# Patient Record
Sex: FEMALE | Race: WHITE | NOT HISPANIC OR LATINO
[De-identification: names, ages, dates, MRNs, and addresses within clinical notes are randomized per-mention and may not be internally consistent; named-entity substitution may affect disease eponyms.]

---

## 2022-03-25 PROBLEM — Z00.00 ENCOUNTER FOR PREVENTIVE HEALTH EXAMINATION: Status: ACTIVE | Noted: 2022-03-25

## 2022-03-28 ENCOUNTER — ASOB RESULT (OUTPATIENT)
Age: 37
End: 2022-03-28

## 2022-03-28 ENCOUNTER — APPOINTMENT (OUTPATIENT)
Dept: ANTEPARTUM | Facility: CLINIC | Age: 37
End: 2022-03-28
Payer: COMMERCIAL

## 2022-03-28 PROCEDURE — 76811 OB US DETAILED SNGL FETUS: CPT

## 2022-04-01 ENCOUNTER — TRANSCRIPTION ENCOUNTER (OUTPATIENT)
Age: 37
End: 2022-04-01

## 2022-07-22 ENCOUNTER — TRANSCRIPTION ENCOUNTER (OUTPATIENT)
Age: 37
End: 2022-07-22

## 2022-07-22 ENCOUNTER — INPATIENT (INPATIENT)
Facility: HOSPITAL | Age: 37
LOS: 0 days | Discharge: HOME | End: 2022-07-23
Attending: OBSTETRICS & GYNECOLOGY | Admitting: OBSTETRICS & GYNECOLOGY

## 2022-07-22 VITALS
DIASTOLIC BLOOD PRESSURE: 67 MMHG | HEART RATE: 75 BPM | SYSTOLIC BLOOD PRESSURE: 125 MMHG | TEMPERATURE: 99 F | RESPIRATION RATE: 14 BRPM

## 2022-07-22 DIAGNOSIS — Z3A.00 WEEKS OF GESTATION OF PREGNANCY NOT SPECIFIED: ICD-10-CM

## 2022-07-22 DIAGNOSIS — O26.899 OTHER SPECIFIED PREGNANCY RELATED CONDITIONS, UNSPECIFIED TRIMESTER: ICD-10-CM

## 2022-07-22 LAB
APTT BLD: 27 SEC — SIGNIFICANT CHANGE UP (ref 27–36.3)
BLD GP AB SCN SERPL QL: NEGATIVE — SIGNIFICANT CHANGE UP
FIBRINOGEN PPP-MCNC: 612 MG/DL — HIGH (ref 330–520)
HCT VFR BLD CALC: 38.2 % — SIGNIFICANT CHANGE UP (ref 34.5–45)
HGB BLD-MCNC: 12.8 G/DL — SIGNIFICANT CHANGE UP (ref 11.5–15.5)
INR BLD: 0.93 RATIO — SIGNIFICANT CHANGE UP (ref 0.88–1.16)
MCHC RBC-ENTMCNC: 32.2 PG — SIGNIFICANT CHANGE UP (ref 27–34)
MCHC RBC-ENTMCNC: 33.5 GM/DL — SIGNIFICANT CHANGE UP (ref 32–36)
MCV RBC AUTO: 96 FL — SIGNIFICANT CHANGE UP (ref 80–100)
NRBC # BLD: 0 /100 WBCS — SIGNIFICANT CHANGE UP
NRBC # FLD: 0 K/UL — SIGNIFICANT CHANGE UP
PLATELET # BLD AUTO: 252 K/UL — SIGNIFICANT CHANGE UP (ref 150–400)
PROTHROM AB SERPL-ACNC: 10.8 SEC — SIGNIFICANT CHANGE UP (ref 10.5–13.4)
RBC # BLD: 3.98 M/UL — SIGNIFICANT CHANGE UP (ref 3.8–5.2)
RBC # FLD: 12.3 % — SIGNIFICANT CHANGE UP (ref 10.3–14.5)
RH IG SCN BLD-IMP: POSITIVE — SIGNIFICANT CHANGE UP
RH IG SCN BLD-IMP: POSITIVE — SIGNIFICANT CHANGE UP
SARS-COV-2 RNA SPEC QL NAA+PROBE: SIGNIFICANT CHANGE UP
WBC # BLD: 9.59 K/UL — SIGNIFICANT CHANGE UP (ref 3.8–10.5)
WBC # FLD AUTO: 9.59 K/UL — SIGNIFICANT CHANGE UP (ref 3.8–10.5)

## 2022-07-22 RX ORDER — OXYTOCIN 10 UNIT/ML
333.33 VIAL (ML) INJECTION
Qty: 20 | Refills: 0 | Status: DISCONTINUED | OUTPATIENT
Start: 2022-07-22 | End: 2022-07-23

## 2022-07-22 RX ORDER — SODIUM CHLORIDE 9 MG/ML
1000 INJECTION, SOLUTION INTRAVENOUS
Refills: 0 | Status: DISCONTINUED | OUTPATIENT
Start: 2022-07-22 | End: 2022-07-23

## 2022-07-22 RX ORDER — CITRIC ACID/SODIUM CITRATE 300-500 MG
15 SOLUTION, ORAL ORAL EVERY 6 HOURS
Refills: 0 | Status: DISCONTINUED | OUTPATIENT
Start: 2022-07-22 | End: 2022-07-23

## 2022-07-22 RX ORDER — CHLORHEXIDINE GLUCONATE 213 G/1000ML
1 SOLUTION TOPICAL ONCE
Refills: 0 | Status: DISCONTINUED | OUTPATIENT
Start: 2022-07-22 | End: 2022-07-23

## 2022-07-22 RX ADMIN — SODIUM CHLORIDE 125 MILLILITER(S): 9 INJECTION, SOLUTION INTRAVENOUS at 19:45

## 2022-07-22 NOTE — DISCHARGE NOTE ANTEPARTUM - ADDITIONAL INSTRUCTIONS
patient had a fall with abdominal trauma in the setting of pregnancy at 2am. Patient was monitored for 24 hours. No signs of labor at this time. Patient cleared for discharge home at this time

## 2022-07-22 NOTE — DISCHARGE NOTE ANTEPARTUM - NS MD DC FALL RISK RISK
For information on Fall & Injury Prevention, visit: [email or web address] OR  [email or web address] OR  https://www.cdc.gov/steadi/patient.html

## 2022-07-22 NOTE — CHART NOTE - NSCHARTNOTEFT_GEN_A_CORE
PA Note    patient marycarmen  not feeling them    VS  T(C): 36.7 (07-22-22 @ 15:28)  HR: 77 (07-22-22 @ 15:28)  BP: 99/53 (07-22-22 @ 15:28)  RR: 17 (07-22-22 @ 15:28)  SpO2: 97% (07-22-22 @ 15:28)    /mod juan josé/+Accels/no decels  [place] irreg q 6-7min  1/50/-3    cont efm/toco  cont monitoring  hui sales

## 2022-07-22 NOTE — DISCHARGE NOTE ANTEPARTUM - HOSPITAL COURSE
patient had a fall with abdominal trauma in the setting of pregnancy at 2am. Patient was monitored for 24 hours. No signs of labor at this time. Patient cleared for discharge home at this time . Patient to follow up with OBGYN as scheduled outpatient, labor precautions given

## 2022-07-22 NOTE — OB RN TRIAGE NOTE - CHIEF COMPLAINT QUOTE
"fell on belly and hit head on night stand, feeling baby move normally. Cramping that started before the fall"

## 2022-07-22 NOTE — DISCHARGE NOTE ANTEPARTUM - CARE PLAN
Principal Discharge DX:	Fall  Assessment and plan of treatment:	patient had a fall with abdominal trauma in the setting of pregnancy at 2am. Patient was monitored for 24 hours. No signs of labor at this time. Patient cleared for discharge home at this time   1

## 2022-07-22 NOTE — OB RN PATIENT PROFILE - FALL HARM RISK - UNIVERSAL INTERVENTIONS
Bed in lowest position, wheels locked, appropriate side rails in place/Call bell, personal items and telephone in reach/Instruct patient to call for assistance before getting out of bed or chair/[place] to call system/Physically safe environment - no spills, clutter or unnecessary equipment/Purposeful Proactive Rounding/Room/bathroom lighting operational, light cord in reach

## 2022-07-22 NOTE — OB PROVIDER H&P - ASSESSMENT
P0 @ 38+4 s/p fall onto abdomen at 2am  admit to L&D  routine labs - cbc, T&S, PT/PTT/INR/Fibrinogen  NPO  IV insert    24hour of monitoring  dw Dr Sonya sales

## 2022-07-22 NOTE — OB PROVIDER H&P - HISTORY OF PRESENT ILLNESS
Patient is a  @ 38+4 gbs + EFW 7.5lbs who presented to triage s/p fall at 2am  patient directly admitted to Antepartum unit    patient states she tripped over her cat at 2am & directly hit her belly onto the floor. States she also hit the R side of her head on her dresser. Denied loss of consciousness. States her current headache is a 2/10 and denies taking any tylenol for the headache.     Patient states she has not felt increased cramping or contractions since the fall. Denies lof/vb & endorses normal fetal movement since the fall    PNC uncomplicated  O+ blood type per patient   accepts blood transfusion    POBHx: P0  GynHx: denies  MedHx: denies  SHx: denies  NKDA  Meds: PNV, Colace  Social: denies     VS  T(C): 37 (22 @ 14:48)  HR: 77 (22 @ 15:07)  BP: 99/53 (22 @ 15:07)  RR: 14 (22 @ 14:48)  SpO2: --    comfortable, alert & oriented   abd soft gravid  /mod juan josé/+accels/no decels   [place] occ ctx   Sono Vertex, anterior placenta, SHAYNA 8.4, BPP 8/8  SVE deferred

## 2022-07-22 NOTE — OB RN TRIAGE NOTE - FALL HARM RISK - UNIVERSAL INTERVENTIONS
Bed in lowest position, wheels locked, appropriate side rails in place/Call bell, personal items and telephone in reach/Instruct patient to call for assistance before getting out of bed or chair/[place] to call system/Physically safe environment - no spills, clutter or unnecessary equipment/Purposeful Proactive Rounding/Room/bathroom lighting operational, light cord in reach

## 2022-07-22 NOTE — DISCHARGE NOTE ANTEPARTUM - PATIENT PORTAL LINK FT
You can access the FollowMyHealth Patient Portal offered by [facility] by registering at the following website: [email or web address]. By joining [facility]’s FollowMyHealth portal, you will also be able to view your health information using other applications (apps) compatible with our system.

## 2022-07-23 VITALS
HEART RATE: 78 BPM | RESPIRATION RATE: 17 BRPM | TEMPERATURE: 99 F | SYSTOLIC BLOOD PRESSURE: 102 MMHG | OXYGEN SATURATION: 97 % | DIASTOLIC BLOOD PRESSURE: 60 MMHG

## 2022-07-23 LAB
COVID-19 SPIKE DOMAIN AB INTERP: POSITIVE
COVID-19 SPIKE DOMAIN ANTIBODY RESULT: >250 U/ML — HIGH
SARS-COV-2 IGG+IGM SERPL QL IA: >250 U/ML — HIGH
SARS-COV-2 IGG+IGM SERPL QL IA: POSITIVE
T PALLIDUM AB TITR SER: NEGATIVE — SIGNIFICANT CHANGE UP

## 2022-07-23 NOTE — PROGRESS NOTE ADULT - ASSESSMENT
38yo  @38w5d s/p fall with direct abdominal trauma at 2am a/f prolonged monitoring. Overnight, patient with regular, painless contractions. No cervical change or VB on SVE.  #Prolonged monitoring  - No evidence of abruption at this time    - Reactive FHT  #Maternal wellbeing  - Regular diet  - SCDs for DVT ppx    Doris Keene, PGY-3

## 2022-07-23 NOTE — PROGRESS NOTE ADULT - SUBJECTIVE AND OBJECTIVE BOX
R3 Antepartum Note,       Patient seen and examined at bedside, no acute overnight events. No acute complaints. Pt reports +FM, denies LOF, VB, ctx.    Vital Signs Last 24 Hours  T(C): 37.5 (07-23-22 @ 00:59), Max: 37.5 (07-23-22 @ 00:59)  HR: 63 (07-23-22 @ 00:59) (63 - 77)  BP: 95/54 (07-23-22 @ 00:59) (95/54 - 125/67)  RR: 18 (07-23-22 @ 00:59) (14 - 18)  SpO2: 96% (07-23-22 @ 00:59) (96% - 98%)    CAPILLARY BLOOD GLUCOSE          Physical Exam:  General: NAD  Abdomen: Soft, non-tender, gravid  Ext: No pain or swelling    SVE: 1/50/-3    Labs:             12.8   9.59  )-----------( 252      ( 07-22 @ 15:54 )             38.2           PT/INR - ( 07-22 @ 15:54 )   PT: 10.8 sec;   INR: 0.93 ratio    PTT - ( 07-22 @ 15:54 )  PTT:27.0 sec    Uric Acid: (07-22 @ 15:54)  --       Fibrinogen: (07-22 @ 15:54)  612      LDH: (07-22 @ 15:54)  --         MEDICATIONS  (STANDING):  chlorhexidine 2% Cloths 1 Application(s) Topical once  citric acid/sodium citrate Solution 15 milliLiter(s) Oral every 6 hours  lactated ringers. 1000 milliLiter(s) (125 mL/Hr) IV Continuous <Continuous>  oxytocin Infusion 333.333 milliUNIT(s)/Min (1000 mL/Hr) IV Continuous <Continuous>    MEDICATIONS  (PRN):

## 2022-07-29 ENCOUNTER — TRANSCRIPTION ENCOUNTER (OUTPATIENT)
Age: 37
End: 2022-07-29

## 2022-07-29 ENCOUNTER — INPATIENT (INPATIENT)
Facility: HOSPITAL | Age: 37
LOS: 1 days | Discharge: HOME | End: 2022-07-31
Attending: OBSTETRICS & GYNECOLOGY | Admitting: OBSTETRICS & GYNECOLOGY

## 2022-07-29 VITALS
TEMPERATURE: 98 F | RESPIRATION RATE: 15 BRPM | DIASTOLIC BLOOD PRESSURE: 61 MMHG | SYSTOLIC BLOOD PRESSURE: 110 MMHG | HEART RATE: 82 BPM

## 2022-07-29 DIAGNOSIS — O26.899 OTHER SPECIFIED PREGNANCY RELATED CONDITIONS, UNSPECIFIED TRIMESTER: ICD-10-CM

## 2022-07-29 DIAGNOSIS — O42.90 PREMATURE RUPTURE OF MEMBRANES, UNSPECIFIED AS TO LENGTH OF TIME BETWEEN RUPTURE AND ONSET OF LABOR, UNSPECIFIED WEEKS OF GESTATION: ICD-10-CM

## 2022-07-29 DIAGNOSIS — O42.10 PREMATURE RUPTURE OF MEMBRANES, ONSET OF LABOR MORE THAN 24 HOURS FOLLOWING RUPTURE, UNSPECIFIED WEEKS OF GESTATION: ICD-10-CM

## 2022-07-29 DIAGNOSIS — Z3A.00 WEEKS OF GESTATION OF PREGNANCY NOT SPECIFIED: ICD-10-CM

## 2022-07-29 LAB
BASOPHILS # BLD AUTO: 0.02 K/UL — SIGNIFICANT CHANGE UP (ref 0–0.2)
BASOPHILS NFR BLD AUTO: 0.1 % — SIGNIFICANT CHANGE UP (ref 0–2)
COVID-19 SPIKE DOMAIN AB INTERP: POSITIVE
COVID-19 SPIKE DOMAIN ANTIBODY RESULT: >250 U/ML — HIGH
EOSINOPHIL # BLD AUTO: 0.01 K/UL — SIGNIFICANT CHANGE UP (ref 0–0.5)
EOSINOPHIL NFR BLD AUTO: 0.1 % — SIGNIFICANT CHANGE UP (ref 0–6)
HCT VFR BLD CALC: 38.9 % — SIGNIFICANT CHANGE UP (ref 34.5–45)
HGB BLD-MCNC: 13.2 G/DL — SIGNIFICANT CHANGE UP (ref 11.5–15.5)
IANC: 14.39 K/UL — HIGH (ref 1.8–7.4)
IMM GRANULOCYTES NFR BLD AUTO: 0.8 % — SIGNIFICANT CHANGE UP (ref 0–1.5)
LYMPHOCYTES # BLD AUTO: 0.88 K/UL — LOW (ref 1–3.3)
LYMPHOCYTES # BLD AUTO: 5.3 % — LOW (ref 13–44)
MCHC RBC-ENTMCNC: 32.4 PG — SIGNIFICANT CHANGE UP (ref 27–34)
MCHC RBC-ENTMCNC: 33.9 GM/DL — SIGNIFICANT CHANGE UP (ref 32–36)
MCV RBC AUTO: 95.6 FL — SIGNIFICANT CHANGE UP (ref 80–100)
MONOCYTES # BLD AUTO: 1.07 K/UL — HIGH (ref 0–0.9)
MONOCYTES NFR BLD AUTO: 6.5 % — SIGNIFICANT CHANGE UP (ref 2–14)
NEUTROPHILS # BLD AUTO: 14.39 K/UL — HIGH (ref 1.8–7.4)
NEUTROPHILS NFR BLD AUTO: 87.2 % — HIGH (ref 43–77)
NRBC # BLD: 0 /100 WBCS — SIGNIFICANT CHANGE UP
NRBC # FLD: 0 K/UL — SIGNIFICANT CHANGE UP
PLATELET # BLD AUTO: 238 K/UL — SIGNIFICANT CHANGE UP (ref 150–400)
RBC # BLD: 4.07 M/UL — SIGNIFICANT CHANGE UP (ref 3.8–5.2)
RBC # FLD: 12.1 % — SIGNIFICANT CHANGE UP (ref 10.3–14.5)
SARS-COV-2 IGG+IGM SERPL QL IA: >250 U/ML — HIGH
SARS-COV-2 IGG+IGM SERPL QL IA: POSITIVE
SARS-COV-2 RNA SPEC QL NAA+PROBE: SIGNIFICANT CHANGE UP
WBC # BLD: 16.51 K/UL — HIGH (ref 3.8–10.5)
WBC # FLD AUTO: 16.51 K/UL — HIGH (ref 3.8–10.5)

## 2022-07-29 DEVICE — SURGICEL FIBRILLAR 1 X 2": Type: IMPLANTABLE DEVICE | Status: FUNCTIONAL

## 2022-07-29 RX ORDER — TETANUS TOXOID, REDUCED DIPHTHERIA TOXOID AND ACELLULAR PERTUSSIS VACCINE, ADSORBED 5; 2.5; 8; 8; 2.5 [IU]/.5ML; [IU]/.5ML; UG/.5ML; UG/.5ML; UG/.5ML
0.5 SUSPENSION INTRAMUSCULAR ONCE
Refills: 0 | Status: DISCONTINUED | OUTPATIENT
Start: 2022-07-29 | End: 2022-07-31

## 2022-07-29 RX ORDER — SODIUM CHLORIDE 9 MG/ML
500 INJECTION, SOLUTION INTRAVENOUS ONCE
Refills: 0 | Status: DISCONTINUED | OUTPATIENT
Start: 2022-07-29 | End: 2022-07-29

## 2022-07-29 RX ORDER — DIPHENHYDRAMINE HCL 50 MG
25 CAPSULE ORAL EVERY 6 HOURS
Refills: 0 | Status: DISCONTINUED | OUTPATIENT
Start: 2022-07-29 | End: 2022-07-31

## 2022-07-29 RX ORDER — OXYCODONE HYDROCHLORIDE 5 MG/1
5 TABLET ORAL ONCE
Refills: 0 | Status: DISCONTINUED | OUTPATIENT
Start: 2022-07-29 | End: 2022-07-31

## 2022-07-29 RX ORDER — AMPICILLIN TRIHYDRATE 250 MG
1 CAPSULE ORAL EVERY 4 HOURS
Refills: 0 | Status: DISCONTINUED | OUTPATIENT
Start: 2022-07-29 | End: 2022-07-29

## 2022-07-29 RX ORDER — SIMETHICONE 80 MG/1
80 TABLET, CHEWABLE ORAL EVERY 4 HOURS
Refills: 0 | Status: DISCONTINUED | OUTPATIENT
Start: 2022-07-29 | End: 2022-07-31

## 2022-07-29 RX ORDER — IBUPROFEN 200 MG
600 TABLET ORAL EVERY 6 HOURS
Refills: 0 | Status: COMPLETED | OUTPATIENT
Start: 2022-07-29 | End: 2023-06-27

## 2022-07-29 RX ORDER — HEPARIN SODIUM 5000 [USP'U]/ML
5000 INJECTION INTRAVENOUS; SUBCUTANEOUS EVERY 12 HOURS
Refills: 0 | Status: DISCONTINUED | OUTPATIENT
Start: 2022-07-29 | End: 2022-07-31

## 2022-07-29 RX ORDER — AMPICILLIN TRIHYDRATE 250 MG
2 CAPSULE ORAL ONCE
Refills: 0 | Status: COMPLETED | OUTPATIENT
Start: 2022-07-29 | End: 2022-07-29

## 2022-07-29 RX ORDER — KETOROLAC TROMETHAMINE 30 MG/ML
30 SYRINGE (ML) INJECTION EVERY 6 HOURS
Refills: 0 | Status: DISCONTINUED | OUTPATIENT
Start: 2022-07-29 | End: 2022-07-30

## 2022-07-29 RX ORDER — SODIUM CHLORIDE 9 MG/ML
1000 INJECTION, SOLUTION INTRAVENOUS
Refills: 0 | Status: DISCONTINUED | OUTPATIENT
Start: 2022-07-29 | End: 2022-07-31

## 2022-07-29 RX ORDER — OXYTOCIN 10 UNIT/ML
333.33 VIAL (ML) INJECTION
Qty: 20 | Refills: 0 | Status: DISCONTINUED | OUTPATIENT
Start: 2022-07-29 | End: 2022-07-31

## 2022-07-29 RX ORDER — OXYCODONE HYDROCHLORIDE 5 MG/1
5 TABLET ORAL
Refills: 0 | Status: COMPLETED | OUTPATIENT
Start: 2022-07-29 | End: 2022-08-05

## 2022-07-29 RX ORDER — MAGNESIUM HYDROXIDE 400 MG/1
30 TABLET, CHEWABLE ORAL
Refills: 0 | Status: DISCONTINUED | OUTPATIENT
Start: 2022-07-29 | End: 2022-07-31

## 2022-07-29 RX ORDER — ACETAMINOPHEN 500 MG
975 TABLET ORAL
Refills: 0 | Status: DISCONTINUED | OUTPATIENT
Start: 2022-07-29 | End: 2022-07-31

## 2022-07-29 RX ORDER — LANOLIN
1 OINTMENT (GRAM) TOPICAL EVERY 6 HOURS
Refills: 0 | Status: DISCONTINUED | OUTPATIENT
Start: 2022-07-29 | End: 2022-07-31

## 2022-07-29 RX ORDER — CHLORHEXIDINE GLUCONATE 213 G/1000ML
1 SOLUTION TOPICAL ONCE
Refills: 0 | Status: DISCONTINUED | OUTPATIENT
Start: 2022-07-29 | End: 2022-07-29

## 2022-07-29 RX ORDER — SODIUM CHLORIDE 9 MG/ML
1000 INJECTION, SOLUTION INTRAVENOUS
Refills: 0 | Status: DISCONTINUED | OUTPATIENT
Start: 2022-07-29 | End: 2022-07-29

## 2022-07-29 RX ADMIN — Medication 30 MILLIGRAM(S): at 06:45

## 2022-07-29 RX ADMIN — Medication 216 GRAM(S): at 16:29

## 2022-07-29 RX ADMIN — Medication 108 GRAM(S): at 21:30

## 2022-07-29 NOTE — OB RN DELIVERY SUMMARY - NSSELHIDDEN_OBGYN_ALL_OB_FT
[NS_DeliveryAttending2_OBGYN_ALL_OB_FT:[id number]=],[NS_DeliveryAssist2_OBGYN_ALL_OB_FT:[id number]=],[NS_DeliveryRN_OBGYN_ALL_OB_FT:[id number]=] [NS_DeliveryAttending2_OBGYN_ALL_OB_FT:[id number]=],[NS_DeliveryAssist2_OBGYN_ALL_OB_FT:[id number]=],[NS_DeliveryRN_OBGYN_ALL_OB_FT:[id number]=],[NS_DeliveryAttending1_OBGYN_ALL_OB_FT:[id number]=]

## 2022-07-29 NOTE — OB RN PATIENT PROFILE - FALL HARM RISK - RISK INTERVENTIONS

## 2022-07-29 NOTE — OB PROVIDER TRIAGE NOTE - HISTORY OF PRESENT ILLNESS
37 y.o. G1 at 39.4w presenting with complaints of leakage of fluid and painful contractions since 2200 yesterday; clear fluid.  Patient reports positive fetal movement, denies vaginal bleeding.    a/p course complications patient reports GBS (+)    pmh: denies  psh: denies  obhx: denies  gynhx: denies    psych/soc hx: anxiety/ depression noted in 20s     Meds: Colace

## 2022-07-29 NOTE — OB RN DELIVERY SUMMARY - NS_SEPSISRSKCALC_OBGYN_ALL_OB_FT
EOS calculated successfully. EOS Risk Factor: 0.5/1000 live births ([facility] national incidence); GA=39w4d; Temp=98.2; ROM=25.667; GBS='Positive'; Antibiotics='GBS specific antibiotics > 2 hrs prior to birth'

## 2022-07-29 NOTE — OB PROVIDER TRIAGE NOTE - NSHPPHYSICALEXAM_GEN_ALL_CORE
Vital Signs Last 24 Hrs  T(C): 36.8 (29 Jul 2022 15:44), Max: 36.8 (29 Jul 2022 15:44)  T(F): 98.2 (29 Jul 2022 15:44), Max: 98.2 (29 Jul 2022 15:44)  HR: 82 (29 Jul 2022 15:44) (82 - 82)  BP: 110/61 (29 Jul 2022 15:44) (110/61 - 110/61)  BP(mean): --  RR: 15 (29 Jul 2022 15:44) (15 - 15)  SpO2: --    Parameters below as of 29 Jul 2022 15:44  Patient On (Oxygen Delivery Method): room air      VSS  Abdomen soft nontender gravid   bpm moderate variability with accelerations   contractions 1-3 minutes     SSE: patient appears grossly ruptured   (+) pooling, (+) nitrazine, (+) fern  VE: 2.5/60/-3    Cephalic presentation via ultrasound

## 2022-07-29 NOTE — OB PROVIDER H&P - ASSESSMENT
37 y.o. G1 at 39.4w admitted for SROM in labor    Routine Admission Labs ordered  COVID 19 PCR Collected  -no partner during time of admission  Consent signed and obtained  -no objections to blood transfusions  GBS Prophylaxis with Ampicillin  Patient does not desire epidural  Expectant management at this time   Re-examine in 2 hour if no cervical change for buccal cytotec     d/w Dr. Tripathi

## 2022-07-29 NOTE — OB RN TRIAGE NOTE - FALL HARM RISK - RISK INTERVENTIONS

## 2022-07-30 LAB
BASOPHILS # BLD AUTO: 0 K/UL — SIGNIFICANT CHANGE UP (ref 0–0.2)
BASOPHILS NFR BLD AUTO: 0 % — SIGNIFICANT CHANGE UP (ref 0–2)
EOSINOPHIL # BLD AUTO: 0 K/UL — SIGNIFICANT CHANGE UP (ref 0–0.5)
EOSINOPHIL NFR BLD AUTO: 0 % — SIGNIFICANT CHANGE UP (ref 0–6)
HCT VFR BLD CALC: 33.5 % — LOW (ref 34.5–45)
HGB BLD-MCNC: 11.7 G/DL — SIGNIFICANT CHANGE UP (ref 11.5–15.5)
IANC: 26.7 K/UL — HIGH (ref 1.8–7.4)
LYMPHOCYTES # BLD AUTO: 0.5 K/UL — LOW (ref 1–3.3)
LYMPHOCYTES # BLD AUTO: 1.7 % — LOW (ref 13–44)
MCHC RBC-ENTMCNC: 33 PG — SIGNIFICANT CHANGE UP (ref 27–34)
MCHC RBC-ENTMCNC: 34.9 GM/DL — SIGNIFICANT CHANGE UP (ref 32–36)
MCV RBC AUTO: 94.4 FL — SIGNIFICANT CHANGE UP (ref 80–100)
MONOCYTES # BLD AUTO: 1.03 K/UL — HIGH (ref 0–0.9)
MONOCYTES NFR BLD AUTO: 3.5 % — SIGNIFICANT CHANGE UP (ref 2–14)
NEUTROPHILS # BLD AUTO: 27.85 K/UL — HIGH (ref 1.8–7.4)
NEUTROPHILS NFR BLD AUTO: 90.4 % — HIGH (ref 43–77)
PLATELET # BLD AUTO: 203 K/UL — SIGNIFICANT CHANGE UP (ref 150–400)
RBC # BLD: 3.55 M/UL — LOW (ref 3.8–5.2)
RBC # FLD: 12.4 % — SIGNIFICANT CHANGE UP (ref 10.3–14.5)
T PALLIDUM AB TITR SER: NEGATIVE — SIGNIFICANT CHANGE UP
WBC # BLD: 29.38 K/UL — HIGH (ref 3.8–10.5)
WBC # FLD AUTO: 29.38 K/UL — HIGH (ref 3.8–10.5)

## 2022-07-30 RX ORDER — IBUPROFEN 200 MG
600 TABLET ORAL EVERY 6 HOURS
Refills: 0 | Status: DISCONTINUED | OUTPATIENT
Start: 2022-07-30 | End: 2022-07-31

## 2022-07-30 RX ORDER — CEFAZOLIN SODIUM 1 G
2000 VIAL (EA) INJECTION EVERY 8 HOURS
Refills: 0 | Status: COMPLETED | OUTPATIENT
Start: 2022-07-30 | End: 2022-07-30

## 2022-07-30 RX ADMIN — Medication 975 MILLIGRAM(S): at 09:00

## 2022-07-30 RX ADMIN — Medication 100 MILLIGRAM(S): at 16:30

## 2022-07-30 RX ADMIN — HEPARIN SODIUM 5000 UNIT(S): 5000 INJECTION INTRAVENOUS; SUBCUTANEOUS at 05:59

## 2022-07-30 RX ADMIN — HEPARIN SODIUM 5000 UNIT(S): 5000 INJECTION INTRAVENOUS; SUBCUTANEOUS at 17:26

## 2022-07-30 RX ADMIN — Medication 30 MILLIGRAM(S): at 12:42

## 2022-07-30 RX ADMIN — Medication 975 MILLIGRAM(S): at 08:15

## 2022-07-30 RX ADMIN — Medication 100 MILLIGRAM(S): at 08:15

## 2022-07-30 RX ADMIN — Medication 30 MILLIGRAM(S): at 06:33

## 2022-07-30 RX ADMIN — Medication 975 MILLIGRAM(S): at 15:05

## 2022-07-30 RX ADMIN — Medication 30 MILLIGRAM(S): at 06:38

## 2022-07-30 RX ADMIN — Medication 975 MILLIGRAM(S): at 21:47

## 2022-07-30 RX ADMIN — Medication 600 MILLIGRAM(S): at 17:26

## 2022-07-30 RX ADMIN — Medication 975 MILLIGRAM(S): at 16:00

## 2022-07-30 RX ADMIN — Medication 975 MILLIGRAM(S): at 22:15

## 2022-07-30 RX ADMIN — Medication 30 MILLIGRAM(S): at 11:55

## 2022-07-30 RX ADMIN — Medication 600 MILLIGRAM(S): at 18:11

## 2022-07-30 NOTE — PROGRESS NOTE ADULT - ASSESSMENT
POD 1 s/p PCS recovering appropriately   [] continue routine postop/postpartum care  [] encourage ambulation   [] continue DVT ppx   [] continue regular diet   [] continue pain management PRN   [] discharge planning/anticipate d/c on POD3

## 2022-07-30 NOTE — OB RN INTRAOPERATIVE NOTE - NSSELHIDDEN_OBGYN_ALL_OB_FT
[NS_DeliveryAttending1_OBGYN_ALL_OB_FT:[id number]=],[NS_DeliveryAssist1_OBGYN_ALL_OB_FT:[id number]=]

## 2022-07-30 NOTE — OB PROVIDER DELIVERY SUMMARY - NSSELHIDDEN_OBGYN_ALL_OB_FT
[NS_DeliveryAttending1_OBGYN_ALL_OB_FT:[id number]=],[NS_DeliveryAssist1_OBGYN_ALL_OB_FT:[id number]=]

## 2022-07-30 NOTE — OB NEONATOLOGY/PEDIATRICIAN DELIVERY SUMMARY - NSPEDSNEONOTESA_OBGYN_ALL_OB_FT
Pediatrician called to delivery for code 100, fetal bradycardia. NICU team with Dr. Gonsalez were present. Female infant born at 39.4 wks via  to a 38 y/o  blood type O+ mother. Maternal history of anxiety.  No significant prenatal history. Prenatal labs nr/immune/-, GBS + on 6/15, patient received ampicillin x4. ROM at 22:00 on  with clear fluids. Baby emerged vigorous, crying. Cord clamping delayed 30 sec. Infant was brought to radiant warmer and warmed, dried, stimulated and suctioned. HR>100, normal respiratory effort. APGARS of 9/9. Mom is initiating breast feeding and formula feeding. Consents to Hepatitis B vaccination. Pediatrician is Lo.  Baby stable for transfer to  nursery. Parents updated. Pediatrician called to delivery for code 100, fetal bradycardia. NICU team with Dr. Gonsalez were present. Female infant born at 39.4 wks via emergency  to a 36 y/o  blood type O+ mother. Maternal history of anxiety.  No significant prenatal history. Prenatal labs nr/immune/-, GBS + on 6/15, patient received ampicillin x4. ROM at 22:00 on  with clear fluids. Baby emerged vigorous, crying. Cord clamping delayed 30 sec. Infant was brought to radiant warmer and warmed, dried, stimulated and suctioned. HR>100, normal respiratory effort. APGARS of 9/9. Mom is initiating breast feeding and formula feeding. Consents to Hepatitis B vaccination. Pediatrician is Lo.  Baby stable for transfer to  nursery. Parents updated. Pediatrician called to delivery for code 100, fetal bradycardia. NICU team with Dr. Gonsalez were present. Female infant born at 39.4 wks via emergency  to a 38 y/o  blood type O+ mother. Maternal history of anxiety.  No significant prenatal history. Prenatal labs nr/immune/-, GBS + on 6/15, patient received ampicillin x4. ROM at 22:00 on  with clear fluids. Baby emerged vigorous, crying. Cord clamping delayed 30 sec. Infant was brought to radiant warmer and warmed, dried, stimulated and suctioned. HR>100, normal respiratory effort. APGARS of 9/9. Mom is initiating breast feeding and formula feeding. Consents to Hepatitis B vaccination. Pediatrician is Lo.  Baby stable for transfer to  nursery. Parents updated.    Note reviewed for accuracy

## 2022-07-30 NOTE — DISCHARGE NOTE OB - INCREASED VAGINAL BLEEDING OR LARGE CLOTS (SATURATING A PAD AN HOUR)
April 14, 2019       Dottie Garcia MD  [address] [facility] [address]  VIA In Basket      Patient: Yoko Sheets   YOB: 1957   Date of Visit: 4/12/2019       Dear Dr. Garcia:    I saw your patient, Yoko Sheets, for an evaluation. Below are my notes for this visit with her.    If you have questions, please do not hesitate to call me.      Sincerely,        Nayan DELUCA DO        CC: No Recipients  Nayan Ace DO  4/12/2019  1:40 PM  Sign at close encounter    Chief Complaint   Patient presents with   • Follow-up       HISTORY OF PRESENT ILLNESS:    Yoko Sheets is a 62 year old female who presented today for f/u.  Recurrent palpitations after colonoscopy  last week.  Occurs throughout day, sporadic .  Able to climb stairs without difficulty.   No cp , [place] or syncopy.      Past Medical History:   Diagnosis Date   • ADHD    • History of anxiety    • History of chest pain    • Palpitations      Past Surgical History:   Procedure Laterality Date   • Colposcopy     • Tonsillectomy and adenoidectomy     • Tubal ligation       ALLERGIES:  No Known Allergies  Social History     Tobacco Use   • Smoking status: Former Smoker   • Smokeless tobacco: Never Used   Substance Use Topics   • Alcohol use: Yes     Alcohol/week: 1.2 oz     Types: 2 Standard drinks or equivalent per week   • Drug use: Not on file      Family History   Problem Relation Age of Onset   • Cancer, Colon Mother    • Motor Vehicle Accident Father      Outpatient Encounter Medications as of 4/12/2019   Medication Sig Dispense Refill   • sertraline (ZOLOFT) 50 MG tablet TAKE 1 AND 1/2 TABLETS BY MOUTH DAILY 135 tablet 0   • Calcium 600 MG tablet Take 1 tablet by mouth daily.      • fexofenadine (ALLEGRA) 180 MG tablet Take 180 mg by mouth daily.     • magnesium chloride 64 MG Tablet Enteric Coated deleayed release tablet Take 1 tablet by mouth daily.     • aspirin-acetaminophen-caffeine (EXCEDRIN MIGRAINE)  250-250-65 MG per tablet Take 1 tablet by mouth as needed.      • ALPRAZolam (XANAX) 0.25 MG tablet TAKE 1 TABLET DAILY AS NEEDED.       No facility-administered encounter medications on file as of 4/12/2019.         Review of Systems   Constitution: Negative.   HENT: Negative.    Eyes: Negative.    Cardiovascular: Negative.    Respiratory: Negative.    Endocrine: Negative.    Hematologic/Lymphatic: Negative.    Skin: Negative.    Musculoskeletal: Negative.    Gastrointestinal: Negative.    Genitourinary: Negative.    Neurological: Negative.    Psychiatric/Behavioral: Negative.    Allergic/Immunologic: Negative.        Vitals:    04/12/19 1301 04/12/19 1303   BP: 98/62 104/70   Pulse: 86    Resp: 16    SpO2: 97%    Weight: 65.3 kg (144 lb)    Height: 5' 2\" (1.575 m)      Physical Exam   Constitutional: She is oriented to person, place, and time. She appears well-developed and well-nourished.   HENT:   Head: Normocephalic.   Neck: Normal range of motion.   Cardiovascular: Normal rate, regular rhythm and normal heart sounds.   Pulmonary/Chest: Effort normal and breath sounds normal.   Abdominal: Normal appearance and bowel sounds are normal.   Musculoskeletal: Normal range of motion.   Neurological: She is alert and oriented to person, place, and time.   Skin: Skin is warm, dry and intact.   Psychiatric: She has a normal mood and affect. Her behavior is normal. Judgment normal. Cognition and memory are normal.   Vitals reviewed.        LABS  No results found for: WBC, RBC, HGB, HCT, PLT, NEUT, LYMP, MON, EOS   Lab Results   Component Value Date/Time    SODIUM 143 10/17/2018 11:05 AM    POTASSIUM 4.2 10/17/2018 11:05 AM    CHLORIDE 106 10/17/2018 11:05 AM    CO2 27 10/17/2018 11:05 AM    BUN 18 10/17/2018 11:05 AM    CREATININE 0.56 10/17/2018 11:05 AM    CALCIUM 9.1 10/17/2018 11:05 AM    ALBUMIN 4.0 10/17/2018 11:05 AM    BILIRUBIN 0.3 10/17/2018 11:05 AM    ALKPT 54 10/17/2018 11:05 AM    GPT 28 10/17/2018 11:05 AM     AST 15 10/17/2018 11:05 AM    GLUCOSE 82 10/17/2018 11:05 AM     Lab Results   Component Value Date/Time    CHOLESTEROL 261 (H) 10/17/2018 11:05 AM    CHOLESTEROL 278 (H) 10/13/2017 11:15 AM    TRIGLYCERIDE 78 10/17/2018 11:05 AM    TRIGLYCERIDE 98 10/13/2017 11:15 AM     10/17/2018 11:05 AM     10/13/2017 11:15 AM    CALCLDL 142 (H) 10/17/2018 11:05 AM    CALCLDL 133 (H) 10/13/2017 11:15 AM      Lab Results   Component Value Date/Time    TSH 2.622 10/17/2018 11:05 AM        CARDIAC DIAGNOSTIC TESTS/IMAGING  Ekg- nsr with pvc's  Stress echo in 2014 normal.    Reassurred pt regarding pvc's, no hx of cad or structural heart disease.    ASSESSMENT/PLAN:  No diagnosis found.  No follow-ups on file.  Yoko was seen today for follow-up.    Diagnoses and all orders for this visit:    Palpitations    Other hyperlipidemia    Symptomatic PVCs      Low risk of cad, structural heart disease.    Reassurred pt regarding pvc's, no hx of cad or structural heart disease.  Encouraged yoga and meditation.  Nayan YOSI Ace, DO              Statement Selected

## 2022-07-30 NOTE — DISCHARGE NOTE OB - MATERIALS PROVIDED
[facility] [place] Screening Program/[place]  Immunization Record/Guide to Postpartum Care/Shaken Baby Prevention Handout/Discharge Medication Information for Patients and Families Pocket Guide

## 2022-07-30 NOTE — DISCHARGE NOTE OB - CARE PROVIDER_API CALL
Jae Oconnor)  Obstetrics and Gynecology  [address] [place] [address]  Phone: [phone]  Fax: [phone]  Follow Up Time:

## 2022-07-30 NOTE — DISCHARGE NOTE OB - MEDICATION SUMMARY - MEDICATIONS TO TAKE
I will START or STAY ON the medications listed below when I get home from the hospital:    acetaminophen 325 mg oral tablet  -- 3 tab(s) by mouth every 8 hours  -- Indication: For  delivery delivered    ibuprofen 600 mg oral tablet  -- 1 tab(s) by mouth every 6 hours  -- Indication: For  delivery delivered

## 2022-07-30 NOTE — OB PROVIDER DELIVERY SUMMARY - NSPROVIDERDELIVERYNOTE_OBGYN_ALL_OB_FT
primary LTCS, uncomplicated  viable female infant, vertex presentation, Apgars 9/9, cord gasses sent  Grossly normal fallopian tubes, uterus, and ovaries with exception of small (<3cm) posterior subserosal fibroid  Uterus closed in 1 layer  Ancef 2g and Azithromycin 500mg given    EBL:   IVF:   UOP:    D# primary LTCS, uncomplicated  viable female infant, vertex presentation, Apgars 9/9, cord gasses sent  Grossly normal fallopian tubes, uterus, and ovaries with exception of small (<3cm) posterior subserosal fibroid  Uterus closed in 1 layer  Ancef 2g and Azithromycin 500mg given    EBL: 704  IVF: 1700  UOP: 350    D# primary LTCS, uncomplicated  viable female infant, vertex presentation, Apgars 9/9, cord gasses sent  Grossly normal fallopian tubes, uterus, and ovaries with exception of small (<3cm) posterior subserosal fibroid  Uterus closed in 1 layer  Ancef 2g and Azithromycin 500mg given    EBL: 704  IVF: 1700  UOP: 350    D#[id number] primary LTCS, uncomplicated  viable female infant, vertex presentation, Apgars 9/9, cord gasses sent  Grossly normal fallopian tubes, uterus, and ovaries with exception of small (<3cm) posterior subserosal fibroid  Uterus closed in 1 layer  Ancef 2g and Azithromycin 500mg given  Methergine, TXA and Pitocin given     EBL: 704  IVF: 1700  UOP: 350    D#[id number]

## 2022-07-30 NOTE — LACTATION INITIAL EVALUATION - LACTATION INTERVENTIONS
initiate/review safe skin-to-skin/initiate/review hand expression/initiate/review techniques for position and latch/initiate/review supplementation plan due to medical indications/review techniques to increase milk supply/review techniques to manage sore nipples/engorgement/initiate/review breast massage/compression/initiate/review alternate feeding method/reviewed components of an effective feeding and at least 8 effective feedings per day required/reviewed importance of monitoring infant diapers, the breastfeeding log, and minimum output each day/reviewed risks of unnecessary formula supplementation/reviewed benefits and recommendations for rooming in/reviewed feeding on demand/by cue at least 8 times a day/recommended follow-up with pediatrician within 24 hours of discharge/reviewed indications of inadequate milk transfer that would require supplementation

## 2022-07-30 NOTE — DISCHARGE NOTE OB - NS MD DC FALL RISK RISK
For information on Fall & Injury Prevention, visit: [email or web address] OR  [email or web address] OR  https://www.cdc.gov/steadi/patient.html

## 2022-07-30 NOTE — DISCHARGE NOTE OB - PATIENT PORTAL LINK FT
You can access the FollowMyHealth Patient Portal offered by [facility] by registering at the following website: [email or web address]. By joining [facility]’s FollowMyHealth portal, you will also be able to view your health information using other applications (apps) compatible with our system.

## 2022-07-31 VITALS
HEART RATE: 87 BPM | OXYGEN SATURATION: 97 % | TEMPERATURE: 98 F | DIASTOLIC BLOOD PRESSURE: 65 MMHG | SYSTOLIC BLOOD PRESSURE: 113 MMHG

## 2022-07-31 RX ORDER — OXYCODONE HYDROCHLORIDE 5 MG/1
5 TABLET ORAL
Refills: 0 | Status: DISCONTINUED | OUTPATIENT
Start: 2022-07-31 | End: 2022-07-31

## 2022-07-31 RX ORDER — IBUPROFEN 200 MG
1 TABLET ORAL
Qty: 0 | Refills: 0 | DISCHARGE
Start: 2022-07-31

## 2022-07-31 RX ORDER — ACETAMINOPHEN 500 MG
3 TABLET ORAL
Qty: 0 | Refills: 0 | DISCHARGE
Start: 2022-07-31

## 2022-07-31 RX ADMIN — Medication 975 MILLIGRAM(S): at 03:45

## 2022-07-31 RX ADMIN — Medication 975 MILLIGRAM(S): at 15:45

## 2022-07-31 RX ADMIN — Medication 600 MILLIGRAM(S): at 00:13

## 2022-07-31 RX ADMIN — Medication 975 MILLIGRAM(S): at 09:00

## 2022-07-31 RX ADMIN — Medication 600 MILLIGRAM(S): at 11:48

## 2022-07-31 RX ADMIN — Medication 600 MILLIGRAM(S): at 01:00

## 2022-07-31 RX ADMIN — Medication 975 MILLIGRAM(S): at 14:59

## 2022-07-31 RX ADMIN — HEPARIN SODIUM 5000 UNIT(S): 5000 INJECTION INTRAVENOUS; SUBCUTANEOUS at 06:00

## 2022-07-31 RX ADMIN — Medication 600 MILLIGRAM(S): at 12:36

## 2022-07-31 RX ADMIN — Medication 975 MILLIGRAM(S): at 03:02

## 2022-07-31 RX ADMIN — Medication 975 MILLIGRAM(S): at 08:15

## 2022-07-31 RX ADMIN — Medication 600 MILLIGRAM(S): at 06:00

## 2022-07-31 RX ADMIN — Medication 600 MILLIGRAM(S): at 06:45

## 2022-07-31 NOTE — PROGRESS NOTE ADULT - SUBJECTIVE AND OBJECTIVE BOX
INTERVAL HPI/OVERNIGHT EVENTS: Pt seen and examined at bedside.  Adequate PO pain control. +flatus. +voiding without difficulty, +ambulation, fannie reg diet.    MEDICATIONS  (STANDING):  acetaminophen     Tablet .. 975 milliGRAM(s) Oral <User Schedule>  ceFAZolin   IVPB 2000 milliGRAM(s) IV Intermittent every 8 hours  diphtheria/tetanus/pertussis (acellular) Vaccine (ADAcel) 0.5 milliLiter(s) IntraMuscular once  heparin   Injectable 5000 Unit(s) SubCutaneous every 12 hours  ibuprofen  Tablet. 600 milliGRAM(s) Oral every 6 hours  ketorolac   Injectable 30 milliGRAM(s) IV Push every 6 hours  lactated ringers. 1000 milliLiter(s) (75 mL/Hr) IV Continuous <Continuous>  oxytocin Infusion 333.333 milliUNIT(s)/Min (1000 mL/Hr) IV Continuous <Continuous>  oxytocin Infusion 333.333 milliUNIT(s)/Min (1000 mL/Hr) IV Continuous <Continuous>    MEDICATIONS  (PRN):  diphenhydrAMINE 25 milliGRAM(s) Oral every 6 hours PRN Pruritus  lanolin Ointment 1 Application(s) Topical every 6 hours PRN Sore Nipples  magnesium hydroxide Suspension 30 milliLiter(s) Oral two times a day PRN Constipation  oxyCODONE    IR 5 milliGRAM(s) Oral every 3 hours PRN Moderate to Severe Pain (4-10)  oxyCODONE    IR 5 milliGRAM(s) Oral once PRN Moderate to Severe Pain (4-10)  simethicone 80 milliGRAM(s) Chew every 4 hours PRN Gas      Vital Signs Last 24 Hrs  T(C): 37.4 (30 Jul 2022 06:30), Max: 37.4 (30 Jul 2022 06:30)  T(F): 99.4 (30 Jul 2022 06:30), Max: 99.4 (30 Jul 2022 06:30)  HR: 90 (30 Jul 2022 06:30) (82 - 124)  BP: 120/68 (30 Jul 2022 06:30) (99/55 - 134/65)  BP(mean): 78 (30 Jul 2022 02:15) (70 - 84)  RR: 18 (30 Jul 2022 06:30) (15 - 24)  SpO2: 97% (30 Jul 2022 04:31) (92% - 100%)    Parameters below as of 30 Jul 2022 06:30  Patient On (Oxygen Delivery Method): room air        PHYSICAL EXAM:    GA: NAD, A+0 x 3  Abd: ( + ) BS, soft, nontender, nondistended, no rebound or guarding,   Uterus: Fundus midline; firm  Incision: C/D/I    LABS:                        13.2   16.51 )-----------( 238      ( 29 Jul 2022 15:55 )             38.9               ABO Interpretation O  Rh Interpretation Positive  Antibody Screen Negative      RADIOLOGY & ADDITIONAL TESTS:  
Postop Day  1  s/p   C- Section    THERAPY:  [x  ] Spinal /epidural  morphine  was given in OR    Subjective: no major complaints    Pain scale score: at rest _3_, with activity _6_    Sedation Score:	  [x  ] Alert	    [  ] Drowsy        [  ] Arousable	[  ] Asleep	[  ] Unresponsive    Side Effects:	  [ x ] None	     [  ] Nausea        [  ] Pruritus        [  ] Weakness   [  ] Numbness      Objective: ambulates,  back non-tender, gross neuro exam normal    T(C): 36.5 (07-30-22 @ 10:30), Max: 37.4 (07-30-22 @ 06:30)  HR: 82 (07-30-22 @ 10:30) (82 - 124)  BP: 95/59 (07-30-22 @ 10:30) (95/59 - 134/65)  RR: 17 (07-30-22 @ 10:30) (15 - 24)  SpO2: 97% (07-30-22 @ 10:30) (92% - 100%)  Wt(kg): --    ASSESSMENT/ PLAN   [ x ] Continue PRN analgesics  [ x ]Documentation and Verification of current medications     acetaminophen     Tablet .. 975 milliGRAM(s) Oral <User Schedule>  ceFAZolin   IVPB 2000 milliGRAM(s) IV Intermittent every 8 hours  diphenhydrAMINE 25 milliGRAM(s) Oral every 6 hours PRN  diphtheria/tetanus/pertussis (acellular) Vaccine (ADAcel) 0.5 milliLiter(s) IntraMuscular once  heparin   Injectable 5000 Unit(s) SubCutaneous every 12 hours  ibuprofen  Tablet. 600 milliGRAM(s) Oral every 6 hours  ketorolac   Injectable 30 milliGRAM(s) IV Push every 6 hours  lactated ringers. 1000 milliLiter(s) IV Continuous <Continuous>  lanolin Ointment 1 Application(s) Topical every 6 hours PRN  magnesium hydroxide Suspension 30 milliLiter(s) Oral two times a day PRN  oxyCODONE    IR 5 milliGRAM(s) Oral every 3 hours PRN  oxyCODONE    IR 5 milliGRAM(s) Oral once PRN  oxytocin Infusion 333.333 milliUNIT(s)/Min IV Continuous <Continuous>  oxytocin Infusion 333.333 milliUNIT(s)/Min IV Continuous <Continuous>  simethicone 80 milliGRAM(s) Chew every 4 hours PRN      Comments: No anesthesia related complications noticed
Post-Operative Note, C/S  She is a  37y woman who is now post-operative day: 2    Subjective:  The patient feels well.  She is ambulating.   She is tolerating regular diet.  She denies nausea and vomiting; denies fever.  She is voiding.  Her pain is controlled; incisional pain is appropriate.  She reports normal postpartum bleeding.  She is breastfeeding.  She is formula feeding.    Physical exam:    Vital Signs Last 24 Hrs  T(C): 36.8 (31 Jul 2022 05:47), Max: 36.8 (31 Jul 2022 05:47)  T(F): 98.2 (31 Jul 2022 05:47), Max: 98.2 (31 Jul 2022 05:47)  HR: 85 (31 Jul 2022 05:47) (74 - 89)  BP: 92/54 (31 Jul 2022 05:47) (92/54 - 103/67)  BP(mean): --  RR: 18 (31 Jul 2022 05:47) (16 - 18)  SpO2: 98% (31 Jul 2022 05:47) (96% - 98%)        Gen: NAD  Breast: Soft, nontender, not engorged.  Abdomen: Soft, nontender, no distension , firm uterine fundus at umbilicus.  Incision: C/D/I.  Pelvic: Normal lochia noted  Ext: No calf tenderness    LABS:                        11.7   29.38 )-----------( 203      ( 30 Jul 2022 06:00 )             33.5       Rubella status:     Allergies    No Known Allergies    Intolerances      MEDICATIONS  (STANDING):  acetaminophen     Tablet .. 975 milliGRAM(s) Oral <User Schedule>  diphtheria/tetanus/pertussis (acellular) Vaccine (ADAcel) 0.5 milliLiter(s) IntraMuscular once  heparin   Injectable 5000 Unit(s) SubCutaneous every 12 hours  ibuprofen  Tablet. 600 milliGRAM(s) Oral every 6 hours  lactated ringers. 1000 milliLiter(s) (75 mL/Hr) IV Continuous <Continuous>  oxytocin Infusion 333.333 milliUNIT(s)/Min (1000 mL/Hr) IV Continuous <Continuous>  oxytocin Infusion 333.333 milliUNIT(s)/Min (1000 mL/Hr) IV Continuous <Continuous>    MEDICATIONS  (PRN):  diphenhydrAMINE 25 milliGRAM(s) Oral every 6 hours PRN Pruritus  lanolin Ointment 1 Application(s) Topical every 6 hours PRN Sore Nipples  magnesium hydroxide Suspension 30 milliLiter(s) Oral two times a day PRN Constipation  oxyCODONE    IR 5 milliGRAM(s) Oral every 3 hours PRN Moderate to Severe Pain (4-10)  oxyCODONE    IR 5 milliGRAM(s) Oral once PRN Moderate to Severe Pain (4-10)  simethicone 80 milliGRAM(s) Chew every 4 hours PRN Gas        Assessment and Plan  POD #2 s/p C/S.  Doing well.  Encourage ambulation.  Incisional care and PO instructions reviewed.

## 2022-10-28 NOTE — OB RN PATIENT PROFILE - THE METHOD OF FEEDING WHEN THE NEWBORN REQUESTS AND CONTINUING EACH FEEDING SESSION UNTIL THE NEWBORN IS SATISFIED
[facility]te[facility] Pregnancy And Lactation Text: This medication is Pregnancy Category C and it isn't known if it is safe during pregnancy. It is unknown if it is excreted in breast milk. Statement Selected

## 2023-03-20 NOTE — OB RN INTRAOPERATIVE NOTE - [email or web address]_OBGYN_ALL_OB_FT
Goal Outcome Evaluation:      Plan of Care Reviewed With: patient    Discharge    Patient discharged to friend's house via taxi (arranged by social work)  Care plan note: A & O x 4. Calm and cooperative. VSS on RA. Denies pain. AVS printed and explained to patient. Home medications returned to patient. Belongings remain with patient.     Listed belongings gathered and given to patient (including from security/pharmacy). Yes  Care Plan and Patient education resolved: Yes  Prescriptions if needed, hard copies sent with patient  NA  Medication Bin checked and emptied on discharge Yes  SW/care coordinator/charge RN aware of discharge: Yes    Leah Blancas, RN on 3/20/2023 at 3:49 PM         KARINA Scott